# Patient Record
Sex: FEMALE | Race: WHITE | NOT HISPANIC OR LATINO | Employment: UNEMPLOYED | URBAN - METROPOLITAN AREA
[De-identification: names, ages, dates, MRNs, and addresses within clinical notes are randomized per-mention and may not be internally consistent; named-entity substitution may affect disease eponyms.]

---

## 2019-11-29 ENCOUNTER — HOSPITAL ENCOUNTER (EMERGENCY)
Facility: HOSPITAL | Age: 69
Discharge: HOME | End: 2019-11-29
Attending: EMERGENCY MEDICINE
Payer: COMMERCIAL

## 2019-11-29 ENCOUNTER — APPOINTMENT (EMERGENCY)
Dept: RADIOLOGY | Facility: HOSPITAL | Age: 69
End: 2019-11-29
Payer: COMMERCIAL

## 2019-11-29 ENCOUNTER — APPOINTMENT (EMERGENCY)
Dept: CT IMAGING | Facility: HOSPITAL | Age: 69
End: 2019-11-29
Payer: COMMERCIAL

## 2019-11-29 VITALS
WEIGHT: 215 LBS | DIASTOLIC BLOOD PRESSURE: 90 MMHG | SYSTOLIC BLOOD PRESSURE: 136 MMHG | OXYGEN SATURATION: 100 % | BODY MASS INDEX: 35.82 KG/M2 | HEART RATE: 89 BPM | HEIGHT: 65 IN | RESPIRATION RATE: 18 BRPM | TEMPERATURE: 97.6 F

## 2019-11-29 DIAGNOSIS — S16.1XXA ACUTE STRAIN OF NECK MUSCLE, INITIAL ENCOUNTER: ICD-10-CM

## 2019-11-29 DIAGNOSIS — M25.539 WRIST PAIN: ICD-10-CM

## 2019-11-29 DIAGNOSIS — M25.569 KNEE PAIN: ICD-10-CM
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
## 2019-11-29 DIAGNOSIS — S09.90XA INJURY OF HEAD, INITIAL ENCOUNTER: ICD-10-CM

## 2019-11-29 PROCEDURE — 70450 CT HEAD/BRAIN W/O DYE: CPT

## 2019-11-29 PROCEDURE — 99285 EMERGENCY DEPT VISIT HI MDM: CPT | Performed by: EMERGENCY MEDICINE

## 2019-11-29 PROCEDURE — 99284 EMERGENCY DEPT VISIT MOD MDM: CPT

## 2019-11-29 PROCEDURE — 72131 CT LUMBAR SPINE W/O DYE: CPT

## 2019-11-29 PROCEDURE — 72125 CT NECK SPINE W/O DYE: CPT

## 2019-11-29 PROCEDURE — 71046 X-RAY EXAM CHEST 2 VIEWS: CPT

## 2019-11-29 PROCEDURE — 73564 X-RAY EXAM KNEE 4 OR MORE: CPT

## 2019-11-29 PROCEDURE — 73110 X-RAY EXAM OF WRIST: CPT

## 2019-11-29 RX ORDER — METHOCARBAMOL 500 MG/1
500 TABLET, FILM COATED ORAL 2 TIMES DAILY
Qty: 20 TABLET | Refills: 0 | Status: SHIPPED | OUTPATIENT
Start: 2019-11-29

## 2019-11-29 RX ORDER — ACETAMINOPHEN 325 MG/1
650 TABLET ORAL ONCE
Status: COMPLETED | OUTPATIENT
Start: 2019-11-29 | End: 2019-11-29

## 2019-11-29 RX ADMIN — ACETAMINOPHEN 650 MG: 325 TABLET, FILM COATED ORAL at 15:00

## 2019-11-29 NOTE — ED NOTES
Patient looking for  Dalton Cockayne  Will make registration aware for when patient arrives      Patient ambulated to [facility]      [id number] Beverly Yosef, RN  11/29/19 1311
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
## 2021-04-13 ENCOUNTER — IMMUNIZATIONS (OUTPATIENT)
Dept: FAMILY MEDICINE CLINIC | Facility: HOSPITAL | Age: 71
End: 2021-04-13

## 2021-04-13 ENCOUNTER — HOSPITAL ENCOUNTER (EMERGENCY)
Facility: HOSPITAL | Age: 71
Discharge: HOME | End: 2021-04-13
Attending: EMERGENCY MEDICINE
Payer: COMMERCIAL

## 2021-04-13 VITALS
RESPIRATION RATE: 18 BRPM | OXYGEN SATURATION: 95 % | HEART RATE: 90 BPM | SYSTOLIC BLOOD PRESSURE: 157 MMHG | DIASTOLIC BLOOD PRESSURE: 76 MMHG

## 2021-04-13 DIAGNOSIS — R68.89 THROAT CONGESTION: Primary | ICD-10-CM

## 2021-04-13 DIAGNOSIS — Z23 ENCOUNTER FOR IMMUNIZATION: Primary | ICD-10-CM

## 2021-04-13 PROCEDURE — 99283 EMERGENCY DEPT VISIT LOW MDM: CPT

## 2021-04-13 PROCEDURE — 99284 EMERGENCY DEPT VISIT MOD MDM: CPT | Performed by: EMERGENCY MEDICINE

## 2021-04-13 PROCEDURE — 0001A SARS-COV-2 / COVID-19 MRNA VACCINE (PFIZER-BIONTECH) 30 MCG: CPT

## 2021-04-13 PROCEDURE — 91300 SARS-COV-2 / COVID-19 MRNA VACCINE (PFIZER-BIONTECH) 30 MCG: CPT

## 2021-04-13 NOTE — ED NOTES
PT awake and alert, no distress noted  No other questions upon d/c       April Marvin Calle RN  04/13/21 2863

## 2021-04-13 NOTE — ED NOTES
Per provider, at this time, we will monitor patient for any rashes, SOB, worsening symptoms, or new complaints  Call bell wtihin reach at bedside       Silvia Booth RN  04/13/21 2930

## 2021-04-13 NOTE — ED NOTES
Patient has no complaints at this time "feeling much better"  Denies any worsening symptoms and no rash noted       Evelin Wang RN  04/13/21 0684

## 2021-04-13 NOTE — ED PROVIDER NOTES
History  Chief Complaint   Patient presents with    Allergic Reaction     pt comes from covid clinic after getting first covid vaccine, started w/ throat itching and feeling light throat was tightening  no meds given pta          70-year-old female with past medical history of diabetes mellitus type 2, hypertension, and asthma who is presenting for evaluation of a possible allergic reaction to the COVID-19 immunization  Patient was brought over from the vaccine clinic for evaluation  She received the immunization about 15 minutes prior to being brought to the ER  She states that shortly after receiving the immunization, she felt a sensation of her throat closing   She also reported excess phlegm in her throat  She has been clearing her throat frequently  Patient also reports that her throat feels somewhat sore  She denies any rash, pruritus, lip swelling, tongue swelling, hoarse voice, difficulty swallowing, difficulty breathing, wheezing, nausea, vomiting, abdominal pain, and diarrhea  No hypotension on initial vital signs  Patient does report having had anaphylactic reactions to the influenza immunization as well as to several classes of antibiotics  Patient did use her inhaler for the symptoms which helped  She denies any other complaints on review of systems  This was the 1st immunization that she had received  Prior to Admission Medications   Prescriptions Last Dose Informant Patient Reported? Taking? methocarbamol (ROBAXIN) 500 mg tablet   No No   Sig: Take 1 tablet (500 mg total) by mouth 2 (two) times a day      Facility-Administered Medications: None       Past Medical History:   Diagnosis Date    Asthma     DM (diabetes mellitus), type 2 ([address] )     Hypertension        Past Surgical History:   Procedure Laterality Date    NOSE SURGERY         History reviewed  No pertinent family history  I have reviewed and agree with the history as documented      E-Cigarette/Vaping E-Cigarette/Vaping Substances     Social History     Tobacco Use    Smoking status: Never Smoker    Smokeless tobacco: Never Used   Substance Use Topics    Alcohol use: Yes     Comment: social    Drug use: Never       Review of Systems   Constitutional: Negative for diaphoresis, fever and unexpected weight change  HENT: Positive for congestion (phlegm in throat) and sore throat  Negative for rhinorrhea  Eyes: Negative for pain, discharge and visual disturbance  Respiratory: Negative for cough, shortness of breath and wheezing  Cardiovascular: Negative for chest pain, palpitations and leg swelling  Gastrointestinal: Negative for abdominal pain, blood in stool, constipation, diarrhea, nausea and vomiting  Genitourinary: Negative for dysuria, flank pain and hematuria  Musculoskeletal: Negative for arthralgias and joint swelling  Skin: Negative for rash and wound  Allergic/Immunologic: Negative for environmental allergies and food allergies  Neurological: Negative for dizziness, seizures, weakness and numbness  Hematological: Negative for adenopathy  Psychiatric/Behavioral: Negative for confusion and hallucinations  Physical Exam  Physical Exam  Vitals signs and nursing note reviewed  Constitutional:       General: She is not in acute distress  Appearance: She is well-developed  Comments: Patient well appearing, no acute distress  HENT:      Head: Normocephalic and atraumatic  Comments: There is no lip swelling, tongue swelling, or swelling noted in the oropharynx  Right Ear: External ear normal       Left Ear: External ear normal    Eyes:      Conjunctiva/sclera: Conjunctivae normal       Pupils: Pupils are equal, round, and reactive to light  Cardiovascular:      Rate and Rhythm: Normal rate and regular rhythm  Heart sounds: Normal heart sounds  No murmur  Pulmonary:      Effort: Pulmonary effort is normal  No respiratory distress        Breath sounds: Normal breath sounds  No stridor  No wheezing or rales  Comments: No stridor  No tachypnea or increased work of breathing  No wheezing  Abdominal:      General: Bowel sounds are normal  There is no distension  Palpations: Abdomen is soft  Tenderness: There is no abdominal tenderness  There is no guarding  Comments: No abdominal tenderness  No vomiting  Musculoskeletal: Normal range of motion  General: No deformity  Skin:     General: Skin is warm and dry  Capillary Refill: Capillary refill takes less than 2 seconds  Comments: There is no erythema or rash  Neurological:      Mental Status: She is alert and oriented to person, place, and time  Psychiatric:         Mood and Affect: Mood normal          Behavior: Behavior normal          Vital Signs  ED Triage Vitals [04/13/21 1147]   Temp Pulse Respirations Blood Pressure SpO2   -- 105 18 157/76 97 %      Temp src Heart Rate Source Patient Position - Orthostatic VS BP Location FiO2 (%)   -- Monitor Sitting Right arm --      Pain Score       --           Vitals:    04/13/21 1147 04/13/21 1148 04/13/21 1200   BP: 157/76     Pulse: 105 96 90   Patient Position - Orthostatic VS: Sitting           Visual Acuity      ED Medications  Medications - No data to display    Diagnostic Studies  Results Reviewed     None                 No orders to display              Procedures  Procedures         ED Course  ED Course as of Apr 13 1230   Tue Apr 13, 2021   1156 Patient immediately seen upon presentation  Her symptoms are not consistent with anaphylaxis  Will observe her to ensure that she does not develop any symptoms concerning for anaphylaxis  There is no indication for IM epinephrine or other medications to treat allergic reaction as the patient has no symptoms to suggest allergic reaction  1214 Patient re-evaluated  She reported that her throat was essentially feeling normal at this point    She did have some phlegm that she was able to cough up  The patient continued to deny any symptoms concerning for anaphylaxis or allergic reaction  She in fact requested to be discharged  I did advise her to stay for additional monitoring with the patient was unwilling to do so  The patient does have capacity to make medical decisions so I will discharge her in accordance with her wishes  Educated her on signs and symptoms concerning for anaphylaxis and advised her to call 911 immediately should she develop any of the symptoms  MDM  Number of Diagnoses or Management Options  Throat congestion: new and does not require workup  Diagnosis management comments:     Patient presented for evaluation from the COVID-19 vaccination clinic shortly after receiving an immunization  She described a sensation of her throat closing with associated phlegm in her throat  She denied any other symptoms  Physical examination was unremarkable  The patient was clearing her throat frequently but she had no stridor, hoarse voice, lip swelling, tongue swelling, or other evidence to suggest airway compromise  There were no other signs or symptoms to suggest anaphylaxis  There was no indication to give epinephrine or other medications for allergic reaction  The patient was observed  She had improvement of her symptoms without any intervention  On reassessment, the patient reported that her symptoms had nearly resolved and she stated that she wanted to go home  I did advise that she stay for a little while longer for additional monitoring but she was not willing to do so  I did educate the patient on signs and symptoms of anaphylaxis and advised to seek medical attention immediately should she develop any the symptoms  Patient verbalized her understanding of the instructions         Amount and/or Complexity of Data Reviewed  Decide to obtain previous medical records or to obtain history from someone other than the patient: yes  Obtain history from someone other than the patient: yes  Review and summarize past medical records: yes  Discuss the patient with other providers: yes    Risk of Complications, Morbidity, and/or Mortality  Presenting problems: low  Diagnostic procedures: minimal  Management options: minimal    Patient Progress  Patient progress: improved      Disposition  Final diagnoses:   Throat congestion     Time reflects when diagnosis was documented in both MDM as applicable and the Disposition within this note     Time User Action Codes Description Comment    4/13/2021 12:15 PM Farnaz Negrete Add [R68 89] Throat congestion       ED Disposition     ED Disposition Condition Date/Time Comment    Discharge Good e Apr 13, 2021 12:15 PM [address] [facility] discharge to home/self care  Follow-up Information     Follow up With Specialties Details Why Contact Info Additional Information    Jannie Dover MD Family Medicine Call  As needed  [address]  [address] [address] Emergency Department Emergency Medicine Go to  If symptoms worsen  [address] [facility] [id number] [place] [address] Emergency Department, [address], [place], [place], [address]          Discharge Medication List as of 4/13/2021 12:17 PM      CONTINUE these medications which have NOT CHANGED    Details   methocarbamol (ROBAXIN) 500 mg tablet Take 1 tablet (500 mg total) by mouth 2 (two) times a day, Starting Fri 11/29/2019, Print           No discharge procedures on file      PDMP Review     None          ED Provider  Electronically Signed by           Fina Caro MD  04/13/21 1468

## 2021-04-13 NOTE — DISCHARGE INSTRUCTIONS
As we discussed, your symptoms were not consistent with an allergic reaction or anaphylaxis  Continue to closely monitor your symptoms  If you develop lightheadedness, a rash, itching, lip swelling, tongue swelling, difficulty breathing, difficulty swallowing, hoarse voice, wheezing, vomiting, abdominal pain, or diarrhea, you should seek medical evaluation because the symptoms could represent an allergic reaction or anaphylaxis  Otherwise, follow-up with your PCP as needed

## 2021-05-04 ENCOUNTER — IMMUNIZATIONS (OUTPATIENT)
Dept: FAMILY MEDICINE CLINIC | Facility: HOSPITAL | Age: 71
End: 2021-05-04

## 2021-05-04 DIAGNOSIS — Z23 ENCOUNTER FOR IMMUNIZATION: Primary | ICD-10-CM

## 2021-05-04 PROCEDURE — 91300 SARS-COV-2 / COVID-19 MRNA VACCINE (PFIZER-BIONTECH) 30 MCG: CPT

## 2021-05-04 PROCEDURE — 0002A SARS-COV-2 / COVID-19 MRNA VACCINE (PFIZER-BIONTECH) 30 MCG: CPT

## 2024-04-18 ENCOUNTER — HOSPITAL ENCOUNTER (OUTPATIENT)
Dept: CT IMAGING | Facility: HOSPITAL | Age: 74
Discharge: HOME | End: 2024-04-18
Attending: OTOLARYNGOLOGY
Payer: MEDICARE

## 2024-04-18 DIAGNOSIS — J34.89 NASAL SEPTAL PERFORATION: ICD-10-CM

## 2024-04-18 DIAGNOSIS — J32.0 CHRONIC MAXILLARY SINUSITIS: ICD-10-CM

## 2024-04-18 DIAGNOSIS — Z87.09 HISTORY OF NASAL POLYPECTOMY: ICD-10-CM

## 2024-04-18 DIAGNOSIS — Z98.890 HISTORY OF NASAL POLYPECTOMY: ICD-10-CM

## 2024-04-18 PROCEDURE — 70486 CT MAXILLOFACIAL W/O DYE: CPT
